# Patient Record
(demographics unavailable — no encounter records)

---

## 2024-11-26 NOTE — HISTORY OF PRESENT ILLNESS
[de-identified] : Patient referred by Dr. Lin for evaluation of multinodular goiter.  During recent physical examination, thyroid felt to be enlarged.  Thyroid ultrasound November 2024: Right lobe 4.4 x 1.7 x 1.8 cm with upper nodule 2.5 cm, TR 4, right lower 9 mm and 8 mm mid nodules.  Left lobe 4.6 x 1.6 x 1.3 cm with 7 mm upper nodule, 8 mm cyst and 8 mm mid cyst.  No enlarged lymph nodes.  TSH 1.4, calcium 9.6.  Patient denies prior history of thyroid disease, dysphagia, change in voice or radiation exposure. I have reviewed all old and new data and available images.  Additional information was obtained from others present at the time of the visit to ensure the completeness of the history.

## 2024-11-26 NOTE — ASSESSMENT
[FreeTextEntry1] : Patient with recently noted multinodular goiter.  I performed an ultrasound-guided fine-needle aspiration in the office today.  Please see separate procedure note.  If cytology is benign, I recommended a repeat ultrasound May 2025, RTO 6 months.  I have answered their questions to the best my ability.

## 2024-11-26 NOTE — PHYSICAL EXAM
[de-identified] : no cervical or supraclavicular adenopathy, trachea midline, thyroid without enlargement with right upper 2.5 cm smooth  palpable mass [Normal] : no neck adenopathy [de-identified] : Skin:  normal appearance.  no rash, nodules, vesicles, or erythema, Musculoskeletal:  full range of motion and no deformities appreciated Neurological:  grossly intact Psychiatric:  oriented to person, place and time with appropriate affect

## 2024-11-26 NOTE — HISTORY OF PRESENT ILLNESS
[de-identified] : Patient referred by Dr. Lin for evaluation of multinodular goiter.  During recent physical examination, thyroid felt to be enlarged.  Thyroid ultrasound November 2024: Right lobe 4.4 x 1.7 x 1.8 cm with upper nodule 2.5 cm, TR 4, right lower 9 mm and 8 mm mid nodules.  Left lobe 4.6 x 1.6 x 1.3 cm with 7 mm upper nodule, 8 mm cyst and 8 mm mid cyst.  No enlarged lymph nodes.  TSH 1.4, calcium 9.6.  Patient denies prior history of thyroid disease, dysphagia, change in voice or radiation exposure. I have reviewed all old and new data and available images.  Additional information was obtained from others present at the time of the visit to ensure the completeness of the history.

## 2024-11-26 NOTE — CONSULT LETTER
[Dear  ___] : Dear  [unfilled], [Consult Letter:] : I had the pleasure of evaluating your patient, [unfilled]. [Please see my note below.] : Please see my note below. [Consult Closing:] : Thank you very much for allowing me to participate in the care of this patient.  If you have any questions, please do not hesitate to contact me. [Sincerely,] : Sincerely, [FreeTextEntry3] : Luli Dey MD, FACS Assistant Professor of Surgery and Otolaryngology [facility]

## 2024-11-26 NOTE — PROCEDURE
[FreeTextEntry1] : Ultrasound-guided thyroid FNA [FreeTextEntry2] : Right upper nodule 2.5 cm, TR 4 [FreeTextEntry3] : Patient for thyroid biopsy. Risks benefits and alternatives discussed including bleeding, infection, swelling and need for repeat biopsy. Questions answered. Consent obtained, timeout taken.  Patient supine. No anesthetic used.  Nodule localized with US guidance Sterile technique with Betadine skin prep. 22-gauge needle under ultrasound guidance with a single pass. Slides prepared sent to cytology.  Post procedure: Hemostasis obtained, patient tolerated well, no complications. Post procedure instructions given.

## 2024-11-26 NOTE — CONSULT LETTER
[Dear  ___] : Dear  [unfilled], [Consult Letter:] : I had the pleasure of evaluating your patient, [unfilled]. [Please see my note below.] : Please see my note below. [Consult Closing:] : Thank you very much for allowing me to participate in the care of this patient.  If you have any questions, please do not hesitate to contact me. [Sincerely,] : Sincerely, [FreeTextEntry3] : Luli Dey MD, FACS Assistant Professor of Surgery and Otolaryngology [facility]

## 2024-11-26 NOTE — REASON FOR VISIT
"SIMVASTATIN 40MG TABS      Last Written Prescription Date:  7/1/19  Last Fill Quantity: 30,   # refills: 0  Last Office Visit: 11/23/18  Future Office visit:       Requested Prescriptions   Pending Prescriptions Disp Refills     simvastatin (ZOCOR) 40 MG tablet [Pharmacy Med Name: SIMVASTATIN 40MG TABS] 30 tablet 0     Sig: TAKE ONE TABLET BY MOUTH EVERY NIGHT AT BEDTIME (NEED FASTING LABS)       Statins Protocol Passed - 7/26/2019  8:43 PM        Passed - LDL on file in past 12 months     Recent Labs   Lab Test 07/11/19  0812   *             Passed - No abnormal creatine kinase in past 12 months     No lab results found.             Passed - Recent (12 mo) or future (30 days) visit within the authorizing provider's specialty     Patient had office visit in the last 12 months or has a visit in the next 30 days with authorizing provider or within the authorizing provider's specialty.  See \"Patient Info\" tab in inbasket, or \"Choose Columns\" in Meds & Orders section of the refill encounter.              Passed - Medication is active on med list        Passed - Patient is age 18 or older        Passed - No active pregnancy on record        Passed - No positive pregnancy test in past 12 months          " [Initial Consultation] : an initial consultation for [FreeTextEntry2] : Multinodular goiter [Procedure: _________] : a [unfilled] procedure visit [Other: _____] : [unfilled]

## 2024-11-26 NOTE — PHYSICAL EXAM
[de-identified] : no cervical or supraclavicular adenopathy, trachea midline, thyroid without enlargement with right upper 2.5 cm smooth  palpable mass [Normal] : no neck adenopathy [de-identified] : Skin:  normal appearance.  no rash, nodules, vesicles, or erythema, Musculoskeletal:  full range of motion and no deformities appreciated Neurological:  grossly intact Psychiatric:  oriented to person, place and time with appropriate affect

## 2025-04-11 NOTE — PHYSICAL EXAM
[No Acute Distress] : in no acute distress [Oriented x3] : oriented to person, place, and time [None] : no CVA tenderness [Labia Majora] : were normal [Labia Minora] : were normal [Bartholin's Gland] : both Bartholin's glands were normal  [Normal Appearance] : general appearance was normal [No Bleeding] : there was no active vaginal bleeding [2] : 2 [Aa ____] : Aa [unfilled] [Ba ____] : Ba [unfilled] [C ____] : C [unfilled] [GH ____] : GH [unfilled] [PB ____] : PB [unfilled] [TVL ____] : TVL  [unfilled] [Ap ____] : Ap [unfilled] [Bp ____] : Bp [unfilled] [D ____] : D [unfilled] [] : I [Normal] : normal [Soft] :  the cervix was soft [Post Void Residual ____ml] : post void residual was [unfilled] ml [Exam Deferred] : was deferred [FreeTextEntry2] : Dr. Rossi [Tenderness] : ~T no ~M abdominal tenderness observed [Distended] : not distended [FreeTextEntry3] : empty supine CST neg

## 2025-04-11 NOTE — OB HISTORY
[Vaginal ___] : [unfilled] vaginal delivery(s) [ ___] : [unfilled]  section delivery(s) [Abnormal Pap Smear] : normal pap smear [Sexually Active] : is not sexually active

## 2025-04-11 NOTE — PROCEDURE
[Straight Catheterization] : insertion of a straight catheter [Stress Incontinence] : stress incontinence [Urgent Incontinence] : urgent incontinence [Urinary Frequency] : urinary frequency [Patient] : the patient [___ Fr Straight Tip] : a [unfilled] in [place] straight tip catheter [None] : there were no complications with the catheter insertion [Clear] : clear [No Complications] : no complications [Tolerated Well] : the patient tolerated the procedure well [Post procedure instructions and information given] : Post procedure instructions and information were given and reviewed with patient. [1] : 1 [FreeTextEntry1] : cathed to obtain pvr and uncontam specimen

## 2025-04-11 NOTE — DISCUSSION/SUMMARY
[FreeTextEntry1] : SUNSHINE is a 62 year female who presents for HAYDE, OAB > JORGE. On exam, negative CST, normal PVR, no POP was noted.  The patient has urinary symptoms consistent with overactive bladder. The etiology of OAB was discussed. Management options including observation, behavioral modifications (dietary changes, monitoring fluid intake, bladder training, timed voids, use of pads/protective garments), kegels, PT, medications, PTNS, SNS, and bladder Botox were all reviewed.  The patient has symptoms consistent with stress urinary incontinence. The etiology of JORGE was discussed. Management options including observation, behavioral modifications, medication, pessary, Impressa insert, periurethral bulking via cystoscopy, and surgery with midurethral sling were reviewed. Other anti-incontinence procedures such as a Patton or fascial sling also reviewed.  Plan: [] pelvic floor PT rx given [] check UA, UCx at subsequent visit for eval  She would like to think about all other options further, likely PTNS. F/U prn. All questions answered.

## 2025-04-11 NOTE — ADDENDUM
[FreeTextEntry1] : This note was written by Lulu Reeder, acting as the  for Dr. Marie. This note accurately reflects the work and decisions made by Dr. Marie.

## 2025-04-11 NOTE — PROCEDURE
[Straight Catheterization] : insertion of a straight catheter [Stress Incontinence] : stress incontinence [Urgent Incontinence] : urgent incontinence [Urinary Frequency] : urinary frequency [Patient] : the patient [___ Fr Straight Tip] : a [unfilled] in [place] straight tip catheter [None] : there were no complications with the catheter insertion [Clear] : clear [No Complications] : no complications [Tolerated Well] : the patient tolerated the procedure well [Post procedure instructions and information given] : Post procedure instructions and information were given and reviewed with patient. [1] : 1 [FreeTextEntry1] : cathed to obtain pvr and uncontam specimen

## 2025-04-11 NOTE — HISTORY OF PRESENT ILLNESS
[FreeTextEntry1] : SUNSHINE is a 62 year female who presents for urgency incontinence. Patient states it has been going on for 2 years. She wear liners but over last year she needs to wear a thicker pad. She wears 1 a day. She has not tried medication or Kegels.  She states OAB > JORGE.    US Renal and Bladder 2024 - Normal renal and bladder sonogram. B/l kidneys normal size, morphology and cortical echotexture. No suspicious renal lesion. No hydronephrosis, shadowing calculi or perinephric fluid.    Daytime frequency: no Nocturia:  x1  Urinary urgency: yes Leakage of urine with urgency: yes Leakage of urine with coughing sneezing laughing: yes Incontinence pad use: 1 a day  Sensation of incomplete bladder emptying: no History of frequent urinary tract infections: no History of hematuria: no Previous treatment: no Vaginal symptoms: no Bowel symptoms: no     OB: C/S x1,  x1; largest child 8lb4oz  GYN: LMP ,  Last pap  - normal,  Denies estrogen use PMH: irritable bowel syndrome, hemorrhoid  PSH: left leg tib/fib fracture -, wrist sx , C/S x1  Meds: Zepbound, Gel shot for knee Allx: NKDA Denies smoking

## 2025-06-24 NOTE — PHYSICAL EXAM
[de-identified] : no cervical or supraclavicular adenopathy, trachea midline, thyroid without enlargement with right upper 2.5 cm smooth  palpable mass [Normal] : no neck adenopathy [de-identified] : Skin:  normal appearance.  no rash, nodules, vesicles, or erythema, Musculoskeletal:  full range of motion and no deformities appreciated Neurological:  grossly intact Psychiatric:  oriented to person, place and time with appropriate affect

## 2025-06-24 NOTE — ASSESSMENT
[FreeTextEntry1] : Patient with recently noted multinodular goiter.  fine-needle aspiration  benign, stable on repeat ultrasound May 2025, recommend continued monitoring with ultrasound November 2025, RTO 6 months.  I have answered their questions to the best my ability.

## 2025-06-24 NOTE — HISTORY OF PRESENT ILLNESS
[de-identified] : Patient referred by Dr. Lin for evaluation of multinodular goiter.  During recent physical examination, thyroid felt to be enlarged.  Thyroid ultrasound November 2024: Right lobe 4.4 x 1.7 x 1.8 cm with upper nodule 2.5 cm, TR 4, right lower 9 mm and 8 mm mid nodules.  Left lobe 4.6 x 1.6 x 1.3 cm with 7 mm upper nodule, 8 mm cyst and 8 mm mid cyst.  No enlarged lymph nodes.  TSH 1.4, calcium 9.6.  Patient denies prior history of thyroid disease, dysphagia, change in voice or radiation exposure.  Biopsy right nodule December 2024 benign.  Follow-up ultrasound: Stable nodules.  I have reviewed all old and new data and available images.  Additional information was obtained from others present at the time of the visit to ensure the completeness of the history.